# Patient Record
Sex: MALE | Race: WHITE
[De-identification: names, ages, dates, MRNs, and addresses within clinical notes are randomized per-mention and may not be internally consistent; named-entity substitution may affect disease eponyms.]

---

## 2017-02-22 ENCOUNTER — HOSPITAL ENCOUNTER (OUTPATIENT)
Dept: HOSPITAL 17 - ELAB | Age: 77
End: 2017-02-22
Attending: INTERNAL MEDICINE
Payer: MEDICARE

## 2017-02-22 DIAGNOSIS — I10: ICD-10-CM

## 2017-02-22 DIAGNOSIS — E78.00: Primary | ICD-10-CM

## 2017-02-22 LAB
ALP SERPL-CCNC: 54 U/L (ref 45–117)
ALT SERPL-CCNC: 30 U/L (ref 12–78)
ANION GAP SERPL CALC-SCNC: 9 MEQ/L (ref 5–15)
AST SERPL-CCNC: 21 U/L (ref 15–37)
BASOPHILS # BLD AUTO: 0.1 TH/MM3 (ref 0–0.2)
BASOPHILS NFR BLD: 0.9 % (ref 0–2)
BILIRUB SERPL-MCNC: 0.9 MG/DL (ref 0.2–1)
BUN SERPL-MCNC: 19 MG/DL (ref 7–18)
CHLORIDE SERPL-SCNC: 104 MEQ/L (ref 98–107)
COLOR UR: YELLOW
COMMENT (UR): (no result)
CULTURE IF INDICATED: (no result)
EOSINOPHIL # BLD: 0.2 TH/MM3 (ref 0–0.4)
EOSINOPHIL NFR BLD: 2.8 % (ref 0–4)
ERYTHROCYTE [DISTWIDTH] IN BLOOD BY AUTOMATED COUNT: 13.7 % (ref 11.6–17.2)
GFR SERPLBLD BASED ON 1.73 SQ M-ARVRAT: 67 ML/MIN (ref 89–?)
GLUCOSE UR STRIP-MCNC: (no result) MG/DL
HCO3 BLD-SCNC: 25.3 MEQ/L (ref 21–32)
HCT VFR BLD CALC: 41.6 % (ref 39–51)
HDLC SERPL-MCNC: 112.2 MG/DL (ref 40–60)
HEMO FLAGS: (no result)
HGB UR QL STRIP: (no result)
KETONES UR STRIP-MCNC: (no result) MG/DL
LDLC SERPL-MCNC: 73 MG/DL (ref 0–99)
LYMPHOCYTES # BLD AUTO: 1.3 TH/MM3 (ref 1–4.8)
LYMPHOCYTES NFR BLD AUTO: 22.3 % (ref 9–44)
MCH RBC QN AUTO: 29.6 PG (ref 27–34)
MCHC RBC AUTO-ENTMCNC: 34 % (ref 32–36)
MCV RBC AUTO: 86.9 FL (ref 80–100)
MONOCYTES NFR BLD: 10.1 % (ref 0–8)
MUCOUS THREADS #/AREA URNS LPF: (no result) /LPF
NEUTROPHILS # BLD AUTO: 3.6 TH/MM3 (ref 1.8–7.7)
NEUTROPHILS NFR BLD AUTO: 63.9 % (ref 16–70)
NITRITE UR QL STRIP: (no result)
PLATELET # BLD: 329 TH/MM3 (ref 150–450)
POTASSIUM SERPL-SCNC: 4 MEQ/L (ref 3.5–5.1)
RBC # BLD AUTO: 4.78 MIL/MM3 (ref 4.5–5.9)
SODIUM SERPL-SCNC: 138 MEQ/L (ref 136–145)
SP GR UR STRIP: 1.02 (ref 1–1.03)
WBC # BLD AUTO: 5.7 TH/MM3 (ref 4–11)

## 2017-02-22 PROCEDURE — 80061 LIPID PANEL: CPT

## 2017-02-22 PROCEDURE — 36415 COLL VENOUS BLD VENIPUNCTURE: CPT

## 2017-02-22 PROCEDURE — 81001 URINALYSIS AUTO W/SCOPE: CPT

## 2017-02-22 PROCEDURE — 85025 COMPLETE CBC W/AUTO DIFF WBC: CPT

## 2017-02-22 PROCEDURE — 84443 ASSAY THYROID STIM HORMONE: CPT

## 2017-02-22 PROCEDURE — 80053 COMPREHEN METABOLIC PANEL: CPT

## 2017-08-28 ENCOUNTER — HOSPITAL ENCOUNTER (OUTPATIENT)
Dept: HOSPITAL 17 - ELAB | Age: 77
End: 2017-08-28
Attending: INTERNAL MEDICINE
Payer: MEDICARE

## 2017-08-28 DIAGNOSIS — Z12.5: ICD-10-CM

## 2017-08-28 DIAGNOSIS — E78.00: Primary | ICD-10-CM

## 2017-08-28 LAB
ALP SERPL-CCNC: 58 U/L (ref 45–117)
ALT SERPL-CCNC: 33 U/L (ref 12–78)
ANION GAP SERPL CALC-SCNC: 6 MEQ/L (ref 5–15)
AST SERPL-CCNC: 21 U/L (ref 15–37)
BILIRUB SERPL-MCNC: 0.8 MG/DL (ref 0.2–1)
BUN SERPL-MCNC: 19 MG/DL (ref 7–18)
CHLORIDE SERPL-SCNC: 105 MEQ/L (ref 98–107)
GFR SERPLBLD BASED ON 1.73 SQ M-ARVRAT: 87 ML/MIN (ref 89–?)
HCO3 BLD-SCNC: 27.3 MEQ/L (ref 21–32)
HDLC SERPL-MCNC: 103.4 MG/DL (ref 40–60)
LDLC SERPL-MCNC: 62 MG/DL (ref 0–99)
POTASSIUM SERPL-SCNC: 4.2 MEQ/L (ref 3.5–5.1)
SODIUM SERPL-SCNC: 138 MEQ/L (ref 136–145)

## 2017-08-28 PROCEDURE — G0103 PSA SCREENING: HCPCS

## 2017-08-28 PROCEDURE — 36415 COLL VENOUS BLD VENIPUNCTURE: CPT

## 2017-08-28 PROCEDURE — 80061 LIPID PANEL: CPT

## 2017-08-28 PROCEDURE — 80053 COMPREHEN METABOLIC PANEL: CPT

## 2018-04-03 ENCOUNTER — HOSPITAL ENCOUNTER (OUTPATIENT)
Dept: HOSPITAL 17 - ELAB | Age: 78
End: 2018-04-03
Attending: INTERNAL MEDICINE
Payer: MEDICARE

## 2018-04-03 DIAGNOSIS — R73.01: ICD-10-CM

## 2018-04-03 DIAGNOSIS — I10: ICD-10-CM

## 2018-04-03 DIAGNOSIS — E78.00: ICD-10-CM

## 2018-04-03 DIAGNOSIS — R53.83: Primary | ICD-10-CM

## 2018-04-03 LAB
ALBUMIN SERPL-MCNC: 3.8 GM/DL (ref 3.4–5)
ALP SERPL-CCNC: 58 U/L (ref 45–117)
ALT SERPL-CCNC: 29 U/L (ref 12–78)
AST SERPL-CCNC: 21 U/L (ref 15–37)
BILIRUB SERPL-MCNC: 0.7 MG/DL (ref 0.2–1)
BUN SERPL-MCNC: 24 MG/DL (ref 7–18)
CALCIUM SERPL-MCNC: 9.4 MG/DL (ref 8.5–10.1)
CHLORIDE SERPL-SCNC: 106 MEQ/L (ref 98–107)
CHOLEST SERPL-MCNC: 186 MG/DL (ref 120–200)
CHOLESTEROL/ HDL RATIO: 2.19 RATIO
CREAT SERPL-MCNC: 1.12 MG/DL (ref 0.6–1.3)
GFR SERPLBLD BASED ON 1.73 SQ M-ARVRAT: 64 ML/MIN (ref 89–?)
HBA1C MFR BLD: 5.6 % (ref 4.3–6)
HCO3 BLD-SCNC: 25.1 MEQ/L (ref 21–32)
HDLC SERPL-MCNC: 84.9 MG/DL (ref 40–60)
LDLC SERPL-MCNC: 87 MG/DL (ref 0–99)
PROT SERPL-MCNC: 7.1 GM/DL (ref 6.4–8.2)
SODIUM SERPL-SCNC: 139 MEQ/L (ref 136–145)
TRIGL SERPL-MCNC: 69 MG/DL (ref 42–150)
VIT B12 SERPL-MCNC: 446 PG/ML (ref 193–986)

## 2018-04-03 PROCEDURE — 80061 LIPID PANEL: CPT

## 2018-04-03 PROCEDURE — 82607 VITAMIN B-12: CPT

## 2018-04-03 PROCEDURE — 36415 COLL VENOUS BLD VENIPUNCTURE: CPT

## 2018-04-03 PROCEDURE — 83036 HEMOGLOBIN GLYCOSYLATED A1C: CPT

## 2018-04-03 PROCEDURE — 84443 ASSAY THYROID STIM HORMONE: CPT

## 2018-04-03 PROCEDURE — 80053 COMPREHEN METABOLIC PANEL: CPT

## 2018-05-02 ENCOUNTER — HOSPITAL ENCOUNTER (OUTPATIENT)
Dept: HOSPITAL 17 - HRAD | Age: 78
End: 2018-05-02
Attending: ORTHOPAEDIC SURGERY
Payer: MEDICARE

## 2018-05-02 DIAGNOSIS — G89.29: ICD-10-CM

## 2018-05-02 DIAGNOSIS — M54.5: Primary | ICD-10-CM

## 2018-05-02 PROCEDURE — 72148 MRI LUMBAR SPINE W/O DYE: CPT

## 2018-05-02 NOTE — RADRPT
EXAM DATE/TIME:  05/02/2018 13:52 

 

[facility] COMPARISON:     

No previous studies available for comparison.

       

 

 

INDICATIONS :     

Lower back pain for 3 years.

                     

 

MEDICAL HISTORY :     

Carcinoma, prostate. Hypertension.   

 

SURGICAL HISTORY :     

Tonsillectomy.     prostate seeds, [employer] pacemaker

 

ENCOUNTER:     

[employer]

 

ACUITY:     

> 1 year

 

PAIN SCORE:     

3/10

 

LOCATION:         

lower back

 

TECHNIQUE:     

Multiplanar multisequence MRI of the lumbar spine was performed without contrast.

 

FINDINGS:     

The most caudal appearing lumbar vertebra is numbered as L5.

 

VERTEBRAE:     

There are degenerative changes throughout the lumbar spine. There is disc dehydration with disc space
 narrowing at multiple levels from L1-L5. There is mild discogenic edema at L1-2. No compression frac
tures are demonstrated. No spondylolisthesis is demonstrated.

 

CONUS:     

Normal level and configuration.

 

T12-L1:  

The thecal sac has a normal diameter.  No evidence of disc bulge or protrusion.  The neural foramina 
are patent bilaterally.

 

L1-L2: 

Broad-based bulging and mild right and left lateral bulging. There is narrowing of the neural foramin
a bilaterally, left greater than right. Bilateral facet arthritis.

 

L2-L3: 

Moderate diffuse broad-based bulging and left lateral bulging with narrowing of the left neural reji
dilip. The right neural foramina is patent. There is bilateral facet arthritis and hypertrophy ligament
s of flava. Moderate spinal canal stenosis.

 

L3-L4: 

Moderate diffuse broad-based bulging and left lateral bulging with narrowing of the left neural reji
dilip. There is mild narrowing of the right neural foramina. There is bilateral facet arthritis and a p
ortion of the ligamentum flavum. Focal moderate to severe spinal canal stenosis.

 

L4-L5: 

Mild to moderate diffuse broad-based bulging and narrowing of the neural foramina bilaterally. Bilate
ral facet arthritis. No significant spinal canal stenosis.

 

L5-S1: 

Diffuse broad-based bulging with mild narrowing of the neural foramina bilaterally. Bilateral facet a
rthritis. No significant spinal canal stenosis.

 

CONCLUSION:     

1. Moderate to severe spinal canal stenosis at L3-4.

2. Moderate spinal canal stenosis at L2-3.

3. Diffuse degenerative changes throughout the lumbar spine with disc degeneration and disc space gregory
rowing at multiple levels.

4. Bilateral facet arthritis at multiple levels.

 

 

 

 Tima Montelongo MD on May 02, 2018 at 15:13           

Board Certified Radiologist.

 This report was verified electronically.